# Patient Record
(demographics unavailable — no encounter records)

---

## 2025-07-08 NOTE — HISTORY OF PRESENT ILLNESS
[Patient reported bone density results were normal] : Patient reported bone density results were normal [FreeTextEntry1] : 2025. ANTONIO MARTINEZ 57 year old female  LMP PM presents for an annual gyn exam.  Pt reports noticing the external vaginal area being irritated after swimming; has also noticed extra dryness within the past week. She does not use a daily moisturizer. Other than that, she denies vaginal bleeding. No vaginal discharge or vaginitis symptoms. No urinary complaints. BM is normal per patient. She denies abdominal and pelvic pain.  Denies changes in medical status, medications, serious illness, hospitalizations, and surgeries.   Pt is not sexually active  Abdominal Pain: Pt reports having some pain while having LRQ pushed on during examination. She reports having soft stool and is constipated at times. She recently did a stool test and other GI exams and is currently awaiting results.  Pt is UTD w/ PCP and colonoscopies  GynHx: Fibroids, HPV PMH: HLD, Anxiety, Granuloma annulaare, Dyslipidemia SHx: C/S x2, tonisectomy FHx: Mother, Father, Sibling: HLD, Father: Heart Disease, Denies FHx of breast, ovarian, uterine or colon cancer. Meds: Albuterol All: Penicillin (rash), Coconut (coconut oil)  PHQ=8 (associated w/ sleeping) [Mammogramdate] : 07/24 [TextBox_19] : BI-RADS2 [PapSmeardate] : 04/24 [BoneDensityDate] : 07/22 [ColonoscopyDate] : 2020

## 2025-07-08 NOTE — PROCEDURE
done [Cervical Pap Smear] : cervical Pap smear [Liquid Base] : liquid base [Tolerated Well] : the patient tolerated the procedure well [No Complications] : there were no complications

## 2025-07-08 NOTE — SIGNATURES
[TextEntry] : I, Shannan Doshi, am scribing for and in the presence of ALEJANDRO Nixon M.D. in the following sections: HISTORY OF PRESENT ILLNESS, PAST MEDICAL/FAMILY/SOCIAL HISTORY, REVIEW OF SYSTEMS, PHYSICAL EXAM, ASSESSMENT/PLAN.     All medical record entries made by the Scribe were at my,  ALEJANDRO Nixon M.D., direction and personally dictated by me on 04/10/2025. I have personally reviewed the chart and agree that the record reflects my personal performance of the history, physical exam, assessment, and plan.

## 2025-07-08 NOTE — PHYSICAL EXAM
[Chaperoned Physical Exam] : A chaperone was present in the examining room during all aspects of the physical examination. [Appropriately responsive] : appropriately responsive [Alert] : alert [No Acute Distress] : no acute distress [No Lymphadenopathy] : no lymphadenopathy [Regular Rate Rhythm] : regular rate rhythm [No Murmurs] : no murmurs [Clear to Auscultation B/L] : clear to auscultation bilaterally [Soft] : soft [Non-tender] : non-tender [Non-distended] : non-distended [No HSM] : No HSM [No Lesions] : no lesions [No Mass] : no mass [Oriented x3] : oriented x3 [Examination Of The Breasts] : a normal appearance [No Masses] : no breast masses were palpable [Labia Majora] : normal [Labia Minora] : normal [Normal] : normal [Uterine Adnexae] : normal [FreeTextEntry2] :  Shannan Doshi  [FreeTextEntry1] :  Medical Scribe

## 2025-07-08 NOTE — PLAN
[FreeTextEntry1] : Health Maintenance: 57 year old female pt presents for annual gyn exam  BSE taught Reviewed diet and exercise Pap/HPV conducted Breast and pelvic exam performed  Rx given for mammogram and breast sonogram Rx for bone density DXA d/w pt  Advised pt to see PCP annually RTO in 1 year for annual or PRN  External Dryness/Vaginal Atrophy: Advised to use a daily moisturizer such as olive oil. Applying towards external and internal folds and if comfortable, a bit on the vaginal canal. Advised to cut caffeine and drink more water Discussed starting HRT if sxs worsens